# Patient Record
Sex: FEMALE | Race: OTHER | Employment: FULL TIME | URBAN - METROPOLITAN AREA
[De-identification: names, ages, dates, MRNs, and addresses within clinical notes are randomized per-mention and may not be internally consistent; named-entity substitution may affect disease eponyms.]

---

## 2024-02-24 ENCOUNTER — HOSPITAL ENCOUNTER (EMERGENCY)
Age: 39
Discharge: HOME | End: 2024-02-24
Payer: COMMERCIAL

## 2024-02-24 VITALS
RESPIRATION RATE: 16 BRPM | DIASTOLIC BLOOD PRESSURE: 73 MMHG | SYSTOLIC BLOOD PRESSURE: 122 MMHG | OXYGEN SATURATION: 100 % | HEART RATE: 65 BPM | TEMPERATURE: 98.4 F

## 2024-02-24 DIAGNOSIS — R20.0 NUMBNESS AND TINGLING IN BOTH HANDS: Primary | ICD-10-CM

## 2024-02-24 DIAGNOSIS — R20.2 NUMBNESS AND TINGLING IN BOTH HANDS: Primary | ICD-10-CM

## 2024-02-24 PROCEDURE — 99282 EMERGENCY DEPT VISIT SF MDM: CPT

## 2024-02-24 NOTE — ED PROVIDER NOTES
[facility]  ED  EMERGENCY DEPARTMENT ENCOUNTER        Pt Name: Jerome Todd  MRN: [id number]  Birthdate 1985  Date of evaluation: 2/24/2024  Provider: ERAN Gastelum  PCP: No primary care provider on file.  Note Started: 5:09 PM EST 2/24/24      GISELA. I have evaluated this patient.        CHIEF COMPLAINT       Chief Complaint   Patient presents with    Tingling     Pt states she has tingling in her hands, face, back of her head and her legs. Pt states this started yesterday morning.        HISTORY OF PRESENT ILLNESS: 1 or more Elements     History from : Patient    Limitations to history : None    Jerome Todd is a 38 y.o. female who presents to the emergency department complaining of a tingling sensation in her hands, face, back, neck, and legs.  Patient states symptoms began yesterday morning.  She first noticed it in her left arm and thought that it was because she was sleeping on her side.  She does not have any associated pain.  She describes it as a fuzzy sensation.  She states it is intermittent and lasts 15 to 30 minutes at a time.  She initially went to urgent care and they sent her over to the emergency department for further evaluation due to concerns with her EKG machine was down and were unsure what other workup she would need.  She denies any chest pain, shortness of breath or cough.  She is eating and drinking appropriately.  She does not have any past medical history.  Patient states she has been under a lot of stress lately, and went through a recent break-up and was traveling on last week to visit family.  She states she has felt pressure and tenseness in her neck which she has been treating with a foam roller.  She denies any weakness or numbness.  No back pain.  No dysuria or hematuria.    Nursing Notes were all reviewed and agreed with or any disagreements were addressed in the HPI.    REVIEW OF SYSTEMS :      Review of Systems    Positives and Pertinent  diagnosis and risks, and we agree with discharging home to follow-up with their primary doctor. We also discussed returning to the Emergency Department immediately if new or worsening symptoms occur. We have discussed the symptoms which are most concerning (e.g., changing or worsening pain, weakness, vomiting, fever) that necessitate immediate return.    Final Impression    1. Numbness and tingling in both hands        Blood pressure 122/73, pulse 65, temperature 98.4 °F (36.9 °C), temperature source Oral, resp. rate 16, last menstrual period 02/14/2024, SpO2 100 %.          I am the Primary Clinician of Record.    FINAL IMPRESSION      1. Numbness and tingling in both hands          DISPOSITION/PLAN     DISPOSITION Decision To Discharge 02/24/2024 05:42:48 PM      PATIENT REFERRED TO:  [facility]  ED  [address] [facility] [address] [phone]  Go to   If symptoms worsen    [facility]  [address], [place] [id number] [phone]  Schedule an appointment as soon as possible for a visit         DISCHARGE MEDICATIONS:  There are no discharge medications for this patient.      DISCONTINUED MEDICATIONS:  There are no discharge medications for this patient.             (Please note that portions of this note were completed with a voice recognition program.  Efforts were made to edit the dictations but occasionally words are mis-transcribed.)    ERAN Gastelum (electronically signed)           Nenita Hanna PA  02/24/24 2055